# Patient Record
Sex: FEMALE | Race: WHITE | NOT HISPANIC OR LATINO
[De-identification: names, ages, dates, MRNs, and addresses within clinical notes are randomized per-mention and may not be internally consistent; named-entity substitution may affect disease eponyms.]

---

## 2017-01-23 ENCOUNTER — ASOB RESULT (OUTPATIENT)
Age: 32
End: 2017-01-23

## 2017-01-23 ENCOUNTER — APPOINTMENT (OUTPATIENT)
Dept: ANTEPARTUM | Facility: CLINIC | Age: 32
End: 2017-01-23

## 2017-06-10 ENCOUNTER — INPATIENT (INPATIENT)
Facility: HOSPITAL | Age: 32
LOS: 1 days | Discharge: HOME | End: 2017-06-12
Payer: COMMERCIAL

## 2017-06-10 VITALS — WEIGHT: 160.94 LBS | HEIGHT: 67 IN

## 2017-06-10 DIAGNOSIS — Z00.00 ENCOUNTER FOR GENERAL ADULT MEDICAL EXAMINATION WITHOUT ABNORMAL FINDINGS: ICD-10-CM

## 2017-06-10 LAB
BASOPHILS # BLD AUTO: 0 K/UL — SIGNIFICANT CHANGE UP (ref 0–0.2)
BASOPHILS NFR BLD AUTO: 0.4 % — SIGNIFICANT CHANGE UP (ref 0–2)
BLD GP AB SCN SERPL QL: NEGATIVE — SIGNIFICANT CHANGE UP
EOSINOPHIL # BLD AUTO: 0 K/UL — SIGNIFICANT CHANGE UP (ref 0–0.5)
EOSINOPHIL NFR BLD AUTO: 0.4 % — SIGNIFICANT CHANGE UP (ref 0–6)
HCT VFR BLD CALC: 35.1 % — SIGNIFICANT CHANGE UP (ref 34.5–45)
HGB BLD-MCNC: 11.6 G/DL — SIGNIFICANT CHANGE UP (ref 11.5–15.5)
LYMPHOCYTES # BLD AUTO: 2.1 K/UL — SIGNIFICANT CHANGE UP (ref 1–3.3)
LYMPHOCYTES # BLD AUTO: 22.2 % — SIGNIFICANT CHANGE UP (ref 13–44)
MCHC RBC-ENTMCNC: 30.7 PG — SIGNIFICANT CHANGE UP (ref 27–34)
MCHC RBC-ENTMCNC: 33.2 GM/DL — SIGNIFICANT CHANGE UP (ref 32–36)
MCV RBC AUTO: 92.6 FL — SIGNIFICANT CHANGE UP (ref 80–100)
MONOCYTES # BLD AUTO: 0.6 K/UL — SIGNIFICANT CHANGE UP (ref 0–0.9)
MONOCYTES NFR BLD AUTO: 6.4 % — SIGNIFICANT CHANGE UP (ref 2–14)
NEUTROPHILS # BLD AUTO: 6.6 K/UL — SIGNIFICANT CHANGE UP (ref 1.8–7.4)
NEUTROPHILS NFR BLD AUTO: 70.6 % — SIGNIFICANT CHANGE UP (ref 43–77)
PLATELET # BLD AUTO: 188 K/UL — SIGNIFICANT CHANGE UP (ref 150–400)
RBC # BLD: 3.78 M/UL — LOW (ref 3.8–5.2)
RBC # FLD: 11.6 % — SIGNIFICANT CHANGE UP (ref 10.3–14.5)
RH IG SCN BLD-IMP: POSITIVE — SIGNIFICANT CHANGE UP
RH IG SCN BLD-IMP: POSITIVE — SIGNIFICANT CHANGE UP
WBC # BLD: 9.4 K/UL — SIGNIFICANT CHANGE UP (ref 3.8–10.5)
WBC # FLD AUTO: 9.4 K/UL — SIGNIFICANT CHANGE UP (ref 3.8–10.5)

## 2017-06-10 RX ORDER — ACETAMINOPHEN 500 MG
975 TABLET ORAL EVERY 6 HOURS
Qty: 0 | Refills: 0 | Status: COMPLETED | OUTPATIENT
Start: 2017-06-11 | End: 2018-05-10

## 2017-06-10 RX ORDER — IBUPROFEN 200 MG
600 TABLET ORAL EVERY 6 HOURS
Qty: 0 | Refills: 0 | Status: DISCONTINUED | OUTPATIENT
Start: 2017-06-11 | End: 2017-06-12

## 2017-06-10 RX ORDER — OXYTOCIN 10 UNIT/ML
41.67 VIAL (ML) INJECTION
Qty: 20 | Refills: 0 | Status: DISCONTINUED | OUTPATIENT
Start: 2017-06-11 | End: 2017-06-12

## 2017-06-10 RX ORDER — DOCUSATE SODIUM 100 MG
100 CAPSULE ORAL
Qty: 0 | Refills: 0 | Status: DISCONTINUED | OUTPATIENT
Start: 2017-06-11 | End: 2017-06-12

## 2017-06-10 RX ORDER — KETOROLAC TROMETHAMINE 30 MG/ML
30 SYRINGE (ML) INJECTION ONCE
Qty: 0 | Refills: 0 | Status: DISCONTINUED | OUTPATIENT
Start: 2017-06-10 | End: 2017-06-10

## 2017-06-10 RX ORDER — OXYTOCIN 10 UNIT/ML
333.33 VIAL (ML) INJECTION
Qty: 20 | Refills: 0 | Status: COMPLETED | OUTPATIENT
Start: 2017-06-10 | End: 2017-06-10

## 2017-06-10 RX ORDER — CITRIC ACID/SODIUM CITRATE 300-500 MG
15 SOLUTION, ORAL ORAL EVERY 4 HOURS
Qty: 0 | Refills: 0 | Status: DISCONTINUED | OUTPATIENT
Start: 2017-06-10 | End: 2017-06-10

## 2017-06-10 RX ORDER — OXYCODONE HYDROCHLORIDE 5 MG/1
5 TABLET ORAL
Qty: 0 | Refills: 0 | Status: DISCONTINUED | OUTPATIENT
Start: 2017-06-11 | End: 2017-06-12

## 2017-06-10 RX ORDER — SODIUM CHLORIDE 9 MG/ML
1000 INJECTION, SOLUTION INTRAVENOUS
Qty: 0 | Refills: 0 | Status: DISCONTINUED | OUTPATIENT
Start: 2017-06-10 | End: 2017-06-12

## 2017-06-10 RX ORDER — SODIUM CHLORIDE 9 MG/ML
3 INJECTION INTRAMUSCULAR; INTRAVENOUS; SUBCUTANEOUS EVERY 8 HOURS
Qty: 0 | Refills: 0 | Status: DISCONTINUED | OUTPATIENT
Start: 2017-06-11 | End: 2017-06-12

## 2017-06-10 RX ORDER — SODIUM CHLORIDE 9 MG/ML
1000 INJECTION, SOLUTION INTRAVENOUS
Qty: 0 | Refills: 0 | Status: DISCONTINUED | OUTPATIENT
Start: 2017-06-10 | End: 2017-06-10

## 2017-06-10 RX ORDER — OXYTOCIN 10 UNIT/ML
2 VIAL (ML) INJECTION
Qty: 30 | Refills: 0 | Status: DISCONTINUED | OUTPATIENT
Start: 2017-06-10 | End: 2017-06-12

## 2017-06-10 RX ORDER — HYDROCORTISONE 1 %
1 OINTMENT (GRAM) TOPICAL EVERY 4 HOURS
Qty: 0 | Refills: 0 | Status: DISCONTINUED | OUTPATIENT
Start: 2017-06-11 | End: 2017-06-12

## 2017-06-10 RX ORDER — DIBUCAINE 1 %
1 OINTMENT (GRAM) RECTAL EVERY 4 HOURS
Qty: 0 | Refills: 0 | Status: DISCONTINUED | OUTPATIENT
Start: 2017-06-11 | End: 2017-06-12

## 2017-06-10 RX ORDER — GLYCERIN ADULT
1 SUPPOSITORY, RECTAL RECTAL AT BEDTIME
Qty: 0 | Refills: 0 | Status: DISCONTINUED | OUTPATIENT
Start: 2017-06-11 | End: 2017-06-12

## 2017-06-10 RX ORDER — OXYCODONE HYDROCHLORIDE 5 MG/1
5 TABLET ORAL EVERY 4 HOURS
Qty: 0 | Refills: 0 | Status: DISCONTINUED | OUTPATIENT
Start: 2017-06-11 | End: 2017-06-12

## 2017-06-10 RX ORDER — SIMETHICONE 80 MG/1
80 TABLET, CHEWABLE ORAL EVERY 6 HOURS
Qty: 0 | Refills: 0 | Status: DISCONTINUED | OUTPATIENT
Start: 2017-06-11 | End: 2017-06-12

## 2017-06-10 RX ORDER — PRAMOXINE HYDROCHLORIDE 150 MG/15G
1 AEROSOL, FOAM RECTAL EVERY 4 HOURS
Qty: 0 | Refills: 0 | Status: DISCONTINUED | OUTPATIENT
Start: 2017-06-11 | End: 2017-06-12

## 2017-06-10 RX ORDER — IBUPROFEN 200 MG
600 TABLET ORAL EVERY 6 HOURS
Qty: 0 | Refills: 0 | Status: COMPLETED | OUTPATIENT
Start: 2017-06-11 | End: 2018-05-10

## 2017-06-10 RX ORDER — DIPHENHYDRAMINE HCL 50 MG
25 CAPSULE ORAL EVERY 6 HOURS
Qty: 0 | Refills: 0 | Status: DISCONTINUED | OUTPATIENT
Start: 2017-06-11 | End: 2017-06-12

## 2017-06-10 RX ORDER — ACETAMINOPHEN 500 MG
650 TABLET ORAL EVERY 6 HOURS
Qty: 0 | Refills: 0 | Status: DISCONTINUED | OUTPATIENT
Start: 2017-06-11 | End: 2017-06-12

## 2017-06-10 RX ORDER — SODIUM CHLORIDE 9 MG/ML
1000 INJECTION, SOLUTION INTRAVENOUS ONCE
Qty: 0 | Refills: 0 | Status: DISCONTINUED | OUTPATIENT
Start: 2017-06-10 | End: 2017-06-10

## 2017-06-10 RX ORDER — LANOLIN
1 OINTMENT (GRAM) TOPICAL EVERY 6 HOURS
Qty: 0 | Refills: 0 | Status: DISCONTINUED | OUTPATIENT
Start: 2017-06-11 | End: 2017-06-12

## 2017-06-10 RX ORDER — TETANUS TOXOID, REDUCED DIPHTHERIA TOXOID AND ACELLULAR PERTUSSIS VACCINE, ADSORBED 5; 2.5; 8; 8; 2.5 [IU]/.5ML; [IU]/.5ML; UG/.5ML; UG/.5ML; UG/.5ML
0.5 SUSPENSION INTRAMUSCULAR ONCE
Qty: 0 | Refills: 0 | Status: COMPLETED | OUTPATIENT
Start: 2017-06-11

## 2017-06-10 RX ORDER — AER TRAVELER 0.5 G/1
1 SOLUTION RECTAL; TOPICAL EVERY 4 HOURS
Qty: 0 | Refills: 0 | Status: DISCONTINUED | OUTPATIENT
Start: 2017-06-11 | End: 2017-06-12

## 2017-06-10 RX ORDER — OXYTOCIN 10 UNIT/ML
333.33 VIAL (ML) INJECTION
Qty: 20 | Refills: 0 | Status: COMPLETED | OUTPATIENT
Start: 2017-06-10

## 2017-06-10 RX ADMIN — SODIUM CHLORIDE 125 MILLILITER(S): 9 INJECTION, SOLUTION INTRAVENOUS at 19:25

## 2017-06-10 RX ADMIN — Medication 30 MILLIGRAM(S): at 23:13

## 2017-06-10 RX ADMIN — Medication 2 MILLIUNIT(S)/MIN: at 19:44

## 2017-06-10 RX ADMIN — Medication 1000 MILLIUNIT(S)/MIN: at 22:27

## 2017-06-10 RX ADMIN — Medication 0.25 MILLIGRAM(S): at 15:55

## 2017-06-10 NOTE — DISCHARGE NOTE OB - CARE PLAN
Principal Discharge DX:	41 weeks gestation of pregnancy  Goal:	Safe delivery of patient  Instructions for follow-up, activity and diet:	Nothing in the vagina, no tampons, douches, baths, swimming or sex for 6-8 weeks.  Regular diet, follow up visit in 3 weeks

## 2017-06-10 NOTE — DISCHARGE NOTE OB - PATIENT PORTAL LINK FT
“You can access the [facility] Patient Portal, offered by [facility], by registering with the following website: [email or web address]”

## 2017-06-10 NOTE — DISCHARGE NOTE OB - MATERIALS PROVIDED
Vaccinations/[facility]  Screening Program/  Immunization Record/Guide to Postpartum Care/Breastfeeding Mother’s Support Group Information/Birth Certificate Instructions/Discharge Medication Information for Patients and Families Pocket Guide/Breastfeeding Guide and Packet/Shaken Baby Prevention Handout/Breastfeeding Log/[facility] Hearing Screen Program

## 2017-06-10 NOTE — DISCHARGE NOTE OB - MEDICATION SUMMARY - MEDICATIONS TO TAKE
I will START or STAY ON the medications listed below when I get home from the hospital:    acetaminophen 325 mg oral tablet  -- 3 tab(s) by mouth every 6 hours  -- Indication: For pain    oxyCODONE 5 mg oral tablet  -- 1 tab(s) by mouth every 6 hours, As Needed MDD:4 tabs/24 h  -- Indication: For sever pain    ibuprofen 600 mg oral tablet  -- 1 tab(s) by mouth every 6 hours, As Needed  -- Indication: For pain    magnesium hydroxide 8% oral suspension  -- 30 milliliter(s) by mouth once, As needed, Constipation  -- Indication: For constipation    dibucaine 1% topical ointment  -- 1 application on skin every 4 hours, As needed, Tenderness of the episiotomy site  -- Indication: For soreness    lanolin topical ointment  -- 1 application on skin every 6 hours, As needed, Sore Nipples  -- Indication: For sore nipples    Prenatal Multivitamins with Folic Acid 1 mg oral tablet  -- 1 tab(s) by mouth once a day  -- Indication: For Vitamin    docusate sodium 100 mg oral capsule  -- 1 cap(s) by mouth 2 times a day  -- Indication: For stool softner

## 2017-06-10 NOTE — DISCHARGE NOTE OB - HOSPITAL COURSE
30y/o W/F G1 at 41w 2d admitted for induction of labor. Pt had IVF to conceive and has had a benign prenatal course.  She has gone post dates and was induced. 30y/o W/F G1 at 41w 2d admitted for induction of labor. Pt had IVF to conceive and has had a benign prenatal course.  She has gone post dates and was induced. pt delivered a viable Baby boy vaginally with an episiotomy. The episiotomy was repaired. She has had a benign prenatal course

## 2017-06-10 NOTE — DISCHARGE NOTE OB - CARE PROVIDER_API CALL
Una Leigh), Obstetrics and Gynecology  [address] [place] [address]  Phone: [phone]  Fax: [phone]

## 2017-06-11 LAB
HBV SURFACE AG SERPL QL IA: SIGNIFICANT CHANGE UP
HCT VFR BLD CALC: 29.6 % — LOW (ref 34.5–45)
HGB BLD-MCNC: 9.8 G/DL — LOW (ref 11.5–15.5)
T PALLIDUM AB TITR SER: NEGATIVE — SIGNIFICANT CHANGE UP

## 2017-06-11 RX ORDER — ACETAMINOPHEN 500 MG
975 TABLET ORAL EVERY 6 HOURS
Qty: 0 | Refills: 0 | Status: DISCONTINUED | OUTPATIENT
Start: 2017-06-11 | End: 2017-06-12

## 2017-06-11 RX ORDER — MAGNESIUM HYDROXIDE 400 MG/1
30 TABLET, CHEWABLE ORAL ONCE
Qty: 0 | Refills: 0 | Status: DISCONTINUED | OUTPATIENT
Start: 2017-06-11 | End: 2017-06-12

## 2017-06-11 RX ORDER — IBUPROFEN 200 MG
600 TABLET ORAL EVERY 6 HOURS
Qty: 0 | Refills: 0 | Status: DISCONTINUED | OUTPATIENT
Start: 2017-06-11 | End: 2017-06-12

## 2017-06-11 RX ORDER — MAGNESIUM HYDROXIDE 400 MG/1
30 TABLET, CHEWABLE ORAL
Qty: 0 | Refills: 0 | Status: DISCONTINUED | OUTPATIENT
Start: 2017-06-11 | End: 2017-06-11

## 2017-06-11 RX ADMIN — Medication 600 MILLIGRAM(S): at 13:16

## 2017-06-11 RX ADMIN — SODIUM CHLORIDE 3 MILLILITER(S): 9 INJECTION INTRAMUSCULAR; INTRAVENOUS; SUBCUTANEOUS at 04:30

## 2017-06-11 RX ADMIN — OXYCODONE HYDROCHLORIDE 5 MILLIGRAM(S): 5 TABLET ORAL at 19:30

## 2017-06-11 RX ADMIN — Medication 600 MILLIGRAM(S): at 23:33

## 2017-06-11 RX ADMIN — OXYCODONE HYDROCHLORIDE 5 MILLIGRAM(S): 5 TABLET ORAL at 09:27

## 2017-06-11 RX ADMIN — Medication 975 MILLIGRAM(S): at 09:26

## 2017-06-11 RX ADMIN — OXYCODONE HYDROCHLORIDE 5 MILLIGRAM(S): 5 TABLET ORAL at 22:00

## 2017-06-11 RX ADMIN — Medication 600 MILLIGRAM(S): at 06:35

## 2017-06-11 RX ADMIN — Medication 100 MILLIGRAM(S): at 06:34

## 2017-06-11 RX ADMIN — Medication 975 MILLIGRAM(S): at 08:56

## 2017-06-11 RX ADMIN — Medication 600 MILLIGRAM(S): at 19:30

## 2017-06-11 RX ADMIN — Medication 975 MILLIGRAM(S): at 15:15

## 2017-06-11 RX ADMIN — Medication 30 MILLIGRAM(S): at 02:30

## 2017-06-11 RX ADMIN — Medication 1 TABLET(S): at 13:16

## 2017-06-11 RX ADMIN — Medication 975 MILLIGRAM(S): at 15:45

## 2017-06-11 RX ADMIN — Medication 600 MILLIGRAM(S): at 18:28

## 2017-06-11 RX ADMIN — OXYCODONE HYDROCHLORIDE 5 MILLIGRAM(S): 5 TABLET ORAL at 13:46

## 2017-06-11 RX ADMIN — OXYCODONE HYDROCHLORIDE 5 MILLIGRAM(S): 5 TABLET ORAL at 13:16

## 2017-06-11 RX ADMIN — Medication 975 MILLIGRAM(S): at 22:00

## 2017-06-11 RX ADMIN — Medication 600 MILLIGRAM(S): at 13:46

## 2017-06-11 RX ADMIN — Medication 975 MILLIGRAM(S): at 20:43

## 2017-06-11 RX ADMIN — OXYCODONE HYDROCHLORIDE 5 MILLIGRAM(S): 5 TABLET ORAL at 18:28

## 2017-06-11 RX ADMIN — OXYCODONE HYDROCHLORIDE 5 MILLIGRAM(S): 5 TABLET ORAL at 08:57

## 2017-06-11 RX ADMIN — OXYCODONE HYDROCHLORIDE 5 MILLIGRAM(S): 5 TABLET ORAL at 21:31

## 2017-06-11 RX ADMIN — Medication 600 MILLIGRAM(S): at 07:05

## 2017-06-11 NOTE — PROGRESS NOTE ADULT - PROBLEM SELECTOR PLAN 1
- Pain well controlled, continue current pain regimen  - Increase ambulation, SCDs when not ambulating  - Continue regular diet

## 2017-06-11 NOTE — PROGRESS NOTE ADULT - ASSESSMENT
A/P: 31yo  PPD#1 s/p .   - Pain well controlled, continue current pain regimen  - Increase ambulation, SCDs when not ambulating  - Continue regular diet    Albina Wooten PGY-1

## 2017-06-11 NOTE — PROGRESS NOTE ADULT - SUBJECTIVE AND OBJECTIVE BOX
OB Progress Note:  PPD#1    S: 33yo  G 1 P1 PPD#1 s/p . Patient feels well. Pain is well controlled. She is tolerating a regular diet and passing flatus. She is voiding spontaneously, and ambulating without difficulty. Denies CP/SOB. Denies lightheadedness/dizziness. Denies N/V.    O:  Vitals:  Vital Signs Last 24 Hrs  T(C): 36.8, Max: 36.9 (06-10 @ 23:00)  T(F): 98.2, Max: 98.4 (06-10 @ 23:00)  HR: 81 (74 - 101)  BP: 97/69 (86/54 - 116/57)  BP(mean): --  RR: 18 (16 - 18)  SpO2: 100% (96% - 100%)    MEDICATIONS  (STANDING):  misoprostol Oral Solution 40MICROGram(s) Oral every 2 hours  misoprostol Oral Solution 60MICROGram(s) Oral every 2 hours  dextrose 5% + lactated ringers. 1000milliLiter(s) IV Continuous <Continuous>  oxytocin Infusion 2milliUNIT(s)/Min IV Continuous <Continuous>  sodium chloride 0.9% lock flush 3milliLiter(s) IV Push every 8 hours  oxytocin Infusion 41.667milliUNIT(s)/Min IV Continuous <Continuous>  sodium chloride 0.9% lock flush 3milliLiter(s) IV Push every 8 hours  diphtheria/tetanus/pertussis (acellular) Vaccine (ADAcel) 0.5milliLiter(s) IntraMuscular once  oxytocin Infusion 41.667milliUNIT(s)/Min IV Continuous <Continuous>  prenatal multivitamin 1Tablet(s) Oral daily  oxyCODONE IR 5milliGRAM(s) Oral every 3 hours  acetaminophen   Tablet. 975milliGRAM(s) Oral every 6 hours  ibuprofen  Tablet 600milliGRAM(s) Oral every 6 hours      Labs:  Blood type: O Positive  RPR: Negative                          9.8<L>   -- >-----------< --    (  @ 06:21 )             29.6<L>                        11.6   9.4 >-----------< 188    ( 06-10 @ 10:25 )             35.1                  Physical Exam:  General: NAD  Abdomen: soft, non-tender, non-distended, fundus firm  Vaginal: Lochia wnl  Extremities: No erythema/edema    A/P: 33yo  PPD#1 s/p .   - Pain well controlled, continue current pain regimen  - Increase ambulation, SCDs when not ambulating  - Continue regular diet    Albina Wooten PGY-1 OB Progress Note:  PPD#1    S: 31yo  G 1 P1 PPD#1 s/p . Patient feels well. Pain is well controlled. She is tolerating a regular diet and passing flatus. She is voiding spontaneously, and ambulating without difficulty. Denies CP/SOB. Denies lightheadedness/dizziness. Denies N/V.    O:  Vitals:  Vital Signs Last 24 Hrs  T(C): 36.8, Max: 36.9 (06-10 @ 23:00)  T(F): 98.2, Max: 98.4 (06-10 @ 23:00)  HR: 81 (74 - 101)  BP: 97/69 (86/54 - 116/57)  BP(mean): --  RR: 18 (16 - 18)  SpO2: 100% (96% - 100%)    MEDICATIONS  (STANDING):  misoprostol Oral Solution 40MICROGram(s) Oral every 2 hours  misoprostol Oral Solution 60MICROGram(s) Oral every 2 hours  dextrose 5% + lactated ringers. 1000milliLiter(s) IV Continuous <Continuous>  oxytocin Infusion 2milliUNIT(s)/Min IV Continuous <Continuous>  sodium chloride 0.9% lock flush 3milliLiter(s) IV Push every 8 hours  oxytocin Infusion 41.667milliUNIT(s)/Min IV Continuous <Continuous>  sodium chloride 0.9% lock flush 3milliLiter(s) IV Push every 8 hours  diphtheria/tetanus/pertussis (acellular) Vaccine (ADAcel) 0.5milliLiter(s) IntraMuscular once  oxytocin Infusion 41.667milliUNIT(s)/Min IV Continuous <Continuous>  prenatal multivitamin 1Tablet(s) Oral daily  oxyCODONE IR 5milliGRAM(s) Oral every 3 hours  acetaminophen   Tablet. 975milliGRAM(s) Oral every 6 hours  ibuprofen  Tablet 600milliGRAM(s) Oral every 6 hours      Labs:  Blood type: O Positive  RPR: Negative                          9.8<L>   -- >-----------< --    (  @ 06:21 )             29.6<L>                        11.6   9.4 >-----------< 188    ( 06-10 @ 10:25 )             35.1                  Physical Exam:  General: NAD  Abdomen: soft, non-tender, non-distended, fundus firm  Vaginal: Lochia wnl  Extremities: No erythema/edema

## 2017-06-11 NOTE — PROGRESS NOTE ADULT - SUBJECTIVE AND OBJECTIVE BOX
S:PANCHO VILLANUEVA is a 32y Female status post vaginal delivery.     No h/o HF  MEWS Score  41 weeks gestation of pregnancy  Vaginal delivery    O:T(C): 36.8, Max: 36.9 (06-10 @ 23:00)  HR: 81 (74 - 101)  BP: 97/69 (86/54 - 116/57)  RR: 18 (16 - 18)  SpO2: 100% (96% - 100%)  Wt(kg): --                 CBC Full  -  ( 11 Jun 2017 06:21 )  WBC Count : x  Hemoglobin : 9.8 g/dL  Hematocrit : 29.6 %  Platelet Count - Automated : x  Mean Cell Volume : x  Mean Cell Hemoglobin : x  Mean Cell Hemoglobin Concentration : x  Auto Neutrophil # : x  Auto Lymphocyte # : x  Auto Monocyte # : x  Auto Eosinophil # : x  Auto Basophil # : x  Auto Neutrophil % : x  Auto Lymphocyte % : x  Auto Monocyte % : x  Auto Eosinophil % : x  Auto Basophil % : x                    Blood type:  ABO Interpretation: O (06-10 @ 13:26)  ABO Interpretation: O (06-10 @ 10:57)        Rh Interpretation: Positive (06-10 @ 13:26)  Rh Interpretation: Positive (06-10 @ 10:57)                           Rubella: Immune           General: alert and oriented        Breast:  soft, nontender,         Abdomin:  soft nontender, BS+        Fundus:  firm, nontender, below the umbilicus        Extremities:  no edema, no cyanosis, normal reflexes        Lochia:  mild        Breast feeding                  A:  Stable postpartum on Day 1.      P:  Routine postpartum care. I reviewed pain medications with her-ibuprofen 600 mg every 6 hours/tylenol 650mg four hours after         ibuprofen dose to cover breakthrough pain.        Doing well

## 2017-06-11 NOTE — ANESTHESIA FOLLOW-UP NOTE - NSEVALATION_GEN_ALL_CORE
All questions were answered/No apparent complications or complaints reguarding anesthesia care at this time

## 2017-06-12 VITALS
HEART RATE: 72 BPM | DIASTOLIC BLOOD PRESSURE: 66 MMHG | OXYGEN SATURATION: 98 % | SYSTOLIC BLOOD PRESSURE: 98 MMHG | TEMPERATURE: 98 F | RESPIRATION RATE: 18 BRPM

## 2017-06-12 LAB — HBV SURFACE AG SERPL QL IA: SIGNIFICANT CHANGE UP

## 2017-06-12 PROCEDURE — 85018 HEMOGLOBIN: CPT

## 2017-06-12 PROCEDURE — 86901 BLOOD TYPING SEROLOGIC RH(D): CPT

## 2017-06-12 PROCEDURE — 85027 COMPLETE CBC AUTOMATED: CPT

## 2017-06-12 PROCEDURE — 59025 FETAL NON-STRESS TEST: CPT

## 2017-06-12 PROCEDURE — 86850 RBC ANTIBODY SCREEN: CPT

## 2017-06-12 PROCEDURE — 86780 TREPONEMA PALLIDUM: CPT

## 2017-06-12 PROCEDURE — 86900 BLOOD TYPING SEROLOGIC ABO: CPT

## 2017-06-12 PROCEDURE — 59050 FETAL MONITOR W/REPORT: CPT

## 2017-06-12 PROCEDURE — 90715 TDAP VACCINE 7 YRS/> IM: CPT

## 2017-06-12 PROCEDURE — 87340 HEPATITIS B SURFACE AG IA: CPT

## 2017-06-12 RX ORDER — ACETAMINOPHEN 500 MG
3 TABLET ORAL
Qty: 0 | Refills: 0 | COMMUNITY
Start: 2017-06-12

## 2017-06-12 RX ORDER — MAGNESIUM HYDROXIDE 400 MG/1
30 TABLET, CHEWABLE ORAL
Qty: 0 | Refills: 0 | COMMUNITY
Start: 2017-06-12

## 2017-06-12 RX ORDER — IBUPROFEN 200 MG
1 TABLET ORAL
Qty: 80 | Refills: 0 | OUTPATIENT
Start: 2017-06-12 | End: 2017-07-02

## 2017-06-12 RX ORDER — OXYCODONE HYDROCHLORIDE 5 MG/1
1 TABLET ORAL
Qty: 20 | Refills: 0 | OUTPATIENT
Start: 2017-06-12 | End: 2017-06-17

## 2017-06-12 RX ORDER — DIBUCAINE 1 %
1 OINTMENT (GRAM) RECTAL
Qty: 0 | Refills: 0 | COMMUNITY
Start: 2017-06-12

## 2017-06-12 RX ORDER — LANOLIN
1 OINTMENT (GRAM) TOPICAL
Qty: 0 | Refills: 0 | COMMUNITY
Start: 2017-06-12

## 2017-06-12 RX ORDER — TETANUS TOXOID, REDUCED DIPHTHERIA TOXOID AND ACELLULAR PERTUSSIS VACCINE, ADSORBED 5; 2.5; 8; 8; 2.5 [IU]/.5ML; [IU]/.5ML; UG/.5ML; UG/.5ML; UG/.5ML
0.5 SUSPENSION INTRAMUSCULAR ONCE
Qty: 0 | Refills: 0 | Status: COMPLETED | OUTPATIENT
Start: 2017-06-12 | End: 2017-06-12

## 2017-06-12 RX ORDER — DOCUSATE SODIUM 100 MG
1 CAPSULE ORAL
Qty: 0 | Refills: 0 | COMMUNITY
Start: 2017-06-12

## 2017-06-12 RX ADMIN — Medication 975 MILLIGRAM(S): at 10:00

## 2017-06-12 RX ADMIN — Medication 600 MILLIGRAM(S): at 18:09

## 2017-06-12 RX ADMIN — OXYCODONE HYDROCHLORIDE 5 MILLIGRAM(S): 5 TABLET ORAL at 06:43

## 2017-06-12 RX ADMIN — OXYCODONE HYDROCHLORIDE 5 MILLIGRAM(S): 5 TABLET ORAL at 02:07

## 2017-06-12 RX ADMIN — Medication 100 MILLIGRAM(S): at 06:43

## 2017-06-12 RX ADMIN — Medication 975 MILLIGRAM(S): at 02:07

## 2017-06-12 RX ADMIN — Medication 1 TABLET(S): at 11:54

## 2017-06-12 RX ADMIN — TETANUS TOXOID, REDUCED DIPHTHERIA TOXOID AND ACELLULAR PERTUSSIS VACCINE, ADSORBED 0.5 MILLILITER(S): 5; 2.5; 8; 8; 2.5 SUSPENSION INTRAMUSCULAR at 02:11

## 2017-06-12 RX ADMIN — Medication 600 MILLIGRAM(S): at 06:43

## 2017-06-12 RX ADMIN — OXYCODONE HYDROCHLORIDE 5 MILLIGRAM(S): 5 TABLET ORAL at 03:00

## 2017-06-12 RX ADMIN — Medication 975 MILLIGRAM(S): at 03:00

## 2017-06-12 RX ADMIN — Medication 600 MILLIGRAM(S): at 17:20

## 2017-06-12 RX ADMIN — Medication 600 MILLIGRAM(S): at 00:00

## 2017-06-12 RX ADMIN — Medication 975 MILLIGRAM(S): at 09:18

## 2017-06-12 NOTE — PROGRESS NOTE ADULT - SUBJECTIVE AND OBJECTIVE BOX
S:  PANCHO VILLANUEVA is a 32y Female [facility] vaginal delivery Day 2.        She is comfortable and offers no compliants.    O:  T(C): 37, Max: 37 (06-12 @ 05:00)  HR: 76 (75 - 76)  BP: 105/69 (96/63 - 105/69)  RR: 18 (18 - 18)  SpO2: 100% (97% - 100%)  Wt(kg): --                                 9.8    x     )-----------( x        ( 11 Jun 2017 06:21 )             29.6               Blood type:  Blood Typing (ABO + Rho D) (06.10.17 @ 13:26)    Rh Interpretation: Positive    ABO Interpretation: O                                Rubella: Immune                General: alert and oriented           Breast:  soft, nontender,            Abdomin:  soft nontender, BS+, Flatus(+), BM(+)           Fundus:  firm, nontender, below the umbilicus           Extremities:  no edema, no cyanosis, normal reflexes           Lochia:  mild    A:  Stable, Postpartum    P:   PANCHO VILLANUEVA will be discharged home today. She is given instructions:                            1. nothing per vagina-no tampons, douches, baths, swiming or sex for 6-8 weeks             2.  to take ibuprofen 600 mg every 6 hours for pain or cramps. (Can take two Tylenol 325mg tablets every 6 hours as an                    alternative pain medication-NO MORE THAN 4000mg of Tylenol over 24hours)                       3.  Call the office and make postpartum visit for 3 weeks; sooner if bleeding becomes heavier, or she has feelings of                  depression or anxiety or develops a fever greater than 100.4 F.                 To use abdominal binder while awake as needed                 Verbal discharge instructions d/w patient  - discharge summary to be  given to her on discharge for the hospital             4.  No driving x 2 weeks.  PANCHO VILLANUEVA is not to drive if taking narcotics             5.  Continue taking prenatal vitamins

## 2020-09-30 NOTE — PATIENT PROFILE OB - EDD BY ULTRASOUND, OB PROFILE
Bedside and Verbal shift change report given to [facility] (oncoming nurse) by Paola Pop Student Nurse  (offgoing nurse). Report included the following information SBAR, Kardex, Intake/Output, MAR, Recent Results, Med Rec Status and Cardiac Rhythm NSR. 01-Jun-2017

## 2020-11-16 DIAGNOSIS — M25.522 PAIN IN LEFT ELBOW: ICD-10-CM

## 2020-11-20 ENCOUNTER — APPOINTMENT (OUTPATIENT)
Dept: ORTHOPEDIC SURGERY | Facility: CLINIC | Age: 35
End: 2020-11-20

## 2020-12-02 ENCOUNTER — APPOINTMENT (OUTPATIENT)
Dept: ORTHOPEDIC SURGERY | Facility: CLINIC | Age: 35
End: 2020-12-02
Payer: COMMERCIAL

## 2020-12-02 VITALS — HEIGHT: 66 IN | WEIGHT: 130 LBS | BODY MASS INDEX: 20.89 KG/M2

## 2020-12-02 DIAGNOSIS — M77.12 LATERAL EPICONDYLITIS, LEFT ELBOW: ICD-10-CM

## 2020-12-02 PROCEDURE — 99203 OFFICE O/P NEW LOW 30 MIN: CPT

## 2020-12-02 PROCEDURE — 99072 ADDL SUPL MATRL&STAF TM PHE: CPT

## 2020-12-02 RX ORDER — DICLOFENAC SODIUM 1% 10 MG/G
1 GEL TOPICAL DAILY
Qty: 1 | Refills: 0 | Status: ACTIVE | COMMUNITY
Start: 2020-12-02 | End: 1900-01-01

## 2020-12-02 NOTE — PHYSICAL EXAM
[de-identified] : General Exam\par \par Well developed, well nourished\par No apparent distress\par Oriented to person, place, and time\par Mood: Normal\par Affect: Normal\par Balance and coordination: Normal\par Gait: Normal\par \par left elbow exam:\par \par Skin: Clean, dry, intact. No ecchymosis. No swelling. No palpable joint effusion. No gross deformity.\par Tenderness: + tenderness to palpation lateral epicondyle, No medial epicondyle, olecranon, radial head. Pain with resisted wrist ext and supination\par ROM:0-140° full pronation full supination\par Stability: Stable to vaus/valgus stress\par Neuro: Negative tinels at ulnar canal, AIN/PIN/Ulnar nerve in tact to motor/sensation.\par Strength: 5/5 elbow flexion, 5/5 elbow extension, 5/5 supination, 5/5 pronation\par Sensation: In tact to light touch throughout\par Vasc: 2+ radial pulse, <2s cap refill\par  [de-identified] : Radiographs obtained reviewed. There is normal alignment of the elbow there is no fracture\par

## 2020-12-02 NOTE — HISTORY OF PRESENT ILLNESS
[de-identified] : 36yo female presents complaining of left lateral elbow pain for several months. This started in the summer time when she was playing sports.  No injuries reported. She has pain lateral aspect of her elbow worse with gripping, lifting. She went to a local urgent care where x-rays were performed. Overall stable. She has not tried any medicine or exercises. Denies numbness tingling\par \par The patient's past medical history, past surgical history, medications, allergies, and social history were reviewed by me today with the patient and documented accordingly. In addition, the patient's family history, which is noncontributory to this visit, was also reviewed.

## 2020-12-02 NOTE — DISCUSSION/SUMMARY
[de-identified] : \par I discussed with the patient the treatment of lateral epicondylitis. I discussed that this is a degenerative condition rather than an inflammatory process. The process is reversible, and the best source of treatment is to reduce the offending repetitive overuse injury process. I counseled the patient how to do this. In addition, treatment includes counterforce bracing, physical therapy for stretching and strengthening, and the use of injection therapy (steroids/PRP etc). I also discussed the role of surgical intervention for may become a chronic condition.